# Patient Record
Sex: MALE | Employment: UNEMPLOYED | URBAN - METROPOLITAN AREA
[De-identification: names, ages, dates, MRNs, and addresses within clinical notes are randomized per-mention and may not be internally consistent; named-entity substitution may affect disease eponyms.]

---

## 2018-04-11 RX ORDER — METHOCARBAMOL 500 MG/1
TABLET, FILM COATED ORAL
COMMUNITY
Start: 2018-03-05

## 2018-04-11 RX ORDER — ASPIRIN 325 MG
325 TABLET ORAL
COMMUNITY

## 2018-04-11 RX ORDER — LISINOPRIL 10 MG/1
10 TABLET ORAL
COMMUNITY
Start: 2018-03-06 | End: 2020-06-23

## 2018-04-11 RX ORDER — MELOXICAM 15 MG/1
TABLET ORAL
COMMUNITY
Start: 2018-03-05

## 2018-04-26 RX ORDER — ATORVASTATIN CALCIUM 40 MG/1
40 TABLET, FILM COATED ORAL
COMMUNITY
Start: 2018-04-23

## 2018-05-01 ENCOUNTER — OFFICE VISIT (OUTPATIENT)
Dept: VASCULAR SURGERY | Facility: CLINIC | Age: 73
End: 2018-05-01
Payer: MEDICARE

## 2018-05-01 VITALS
HEART RATE: 64 BPM | WEIGHT: 141.31 LBS | DIASTOLIC BLOOD PRESSURE: 84 MMHG | BODY MASS INDEX: 19.14 KG/M2 | HEIGHT: 72 IN | SYSTOLIC BLOOD PRESSURE: 144 MMHG

## 2018-05-01 DIAGNOSIS — Z72.0 CONTINUOUS TOBACCO ABUSE: ICD-10-CM

## 2018-05-01 DIAGNOSIS — R91.1 LUNG NODULE, SOLITARY: ICD-10-CM

## 2018-05-01 DIAGNOSIS — F17.210 CIGARETTE NICOTINE DEPENDENCE WITHOUT COMPLICATION: ICD-10-CM

## 2018-05-01 DIAGNOSIS — I71.40 ABDOMINAL AORTIC ANEURYSM WITHOUT RUPTURE: Primary | ICD-10-CM

## 2018-05-01 DIAGNOSIS — J43.1 PANLOBULAR EMPHYSEMA: ICD-10-CM

## 2018-05-01 PROCEDURE — 99213 OFFICE O/P EST LOW 20 MIN: CPT | Mod: PBBFAC,PO | Performed by: THORACIC SURGERY (CARDIOTHORACIC VASCULAR SURGERY)

## 2018-05-01 PROCEDURE — 99406 BEHAV CHNG SMOKING 3-10 MIN: CPT | Mod: S$PBB,,, | Performed by: THORACIC SURGERY (CARDIOTHORACIC VASCULAR SURGERY)

## 2018-05-01 PROCEDURE — 99999 PR PBB SHADOW E&M-EST. PATIENT-LVL III: CPT | Mod: PBBFAC,,, | Performed by: THORACIC SURGERY (CARDIOTHORACIC VASCULAR SURGERY)

## 2018-05-01 PROCEDURE — 99205 OFFICE O/P NEW HI 60 MIN: CPT | Mod: 25,S$PBB,, | Performed by: THORACIC SURGERY (CARDIOTHORACIC VASCULAR SURGERY)

## 2018-05-01 RX ORDER — IBUPROFEN 200 MG
1 TABLET ORAL DAILY
Qty: 30 PATCH | Refills: 3 | COMMUNITY
Start: 2018-05-01

## 2018-05-01 NOTE — LETTER
May 1, 2018      [place]  [address] [address] [facility] [address]  [facility] [address] [place] [address]-Cardiovascular Surgery  [address] [facility] [address]  Phone: [phone]          Patient: Kevin Benjamin   MR Number: [id number]   YOB: 1945   Date of Visit: 5/1/2018       Dear Margarita Mathew:    Thank you for referring Kevin Benjamin to me for evaluation. Attached you will find relevant portions of my assessment and plan of care.    If you have questions, please do not hesitate to call me. I look forward to following Kevin Benjamin along with you.    Sincerely,    Serg Driscoll MD    Enclosure  CC:  No Recipients    If you would like to receive this communication electronically, please contact [email or web address] or [phone] to request more information on [employer] Link access.    For providers and/or their staff who would like to refer a patient to [facility], please contact us through our one-stop-shop provider referral line, [facility], at [phone].    If you feel you have received this communication in error or would no longer like to receive these types of communications, please e-mail [email or web address]

## 2018-05-01 NOTE — PROGRESS NOTES
CHIEF COMPLAINT:   Chief Complaint   Patient presents with    Abdominal Aortic Aneurysm     Priyanka NP/AAA       REFERRING PROVIDER: Margarita Mathew NP    Reason for consult: Evaluation of abdominal aortic aneurysm    Subjective:     Patient is a 72 y.o. male whose nurse practitioner felt a pulsatility in the abdomen.  He was referred for ultrasound revealing an abdominal aortic aneurysm.  He also was referred for CT scan of the chest which revealed a small lung nodule and emphysema.  He is a long-term smoker of greater than 57 pack years.  He smokes 1 pack per day.  He has hypertension.  He does not always take his antihypertensives since some of his associates told him that lisinopril did not agree with them.    Patient Active Problem List    Diagnosis Date Noted    Abdominal aortic aneurysm without rupture 05/01/2018    Lung nodule, solitary 05/01/2018    Panlobular emphysema 05/01/2018    Continuous tobacco abuse 05/01/2018    Cigarette nicotine dependence without complication 05/01/2018     Past Medical History:   Diagnosis Date    Abdominal aortic aneurysm without rupture 5/1/2018    Cigarette nicotine dependence without complication 5/1/2018    Continuous tobacco abuse 5/1/2018    Hypertension     Lung nodule, solitary 5/1/2018    Panlobular emphysema 5/1/2018      Past Surgical History:   Procedure Laterality Date    Essential hypertension        Medication List with Changes/Refills   Current Medications    ASPIRIN (ECOTRIN) 81 MG EC TABLET    Take 81 mg by mouth.    ATORVASTATIN (LIPITOR) 40 MG TABLET    Take 40 mg by mouth.    LISINOPRIL 10 MG TABLET    Take 10 mg by mouth.    MELOXICAM (MOBIC) 15 MG TABLET    TAKE 1 TABLET BY MOUTH EVERY DAY WITH BREAKFAST    METHOCARBAMOL (ROBAXIN) 500 MG TAB    Take 1 tablet by mouth at bedtime if needed.     Review of patient's allergies indicates:  No Known Allergies     Social History   Substance Use Topics    Smoking status: Current Every Day  Smoker     Packs/day: 1.00     Years: 57.00     Types: Cigarettes    Smokeless tobacco: Never Used    Alcohol use Yes      Comment: occasional/beer      Family History   Problem Relation Age of Onset    Cancer Mother     Heart attack Father     COPD Sister       Review of Systems  Gen.: No fevers, chills, night sweats, weight changes.  HEENT: No visual field changes or hoarseness.  Neck: No complaints.  Respiratory: Positive mild shortness of breath.  Positive cough especially if he uses his air-conditioning at night (he has not changed his air-conditioning a filter in quite a while).  No hemoptysis.  Cardiac: No angina, orthopnea, PND.  : No dysuria frequency.  GI: No melena or diarrhea.  Musculoskeletal: No significant arthralgias.  Neurologic: No lateralizing complaints.  Vascular: No claudication.    Objective: Physical exam      Vitals:    05/01/18 0957   BP: (!) 144/84   Pulse: 64   Weight: 64.1 kg (141 lb 5 oz)   Height: 6' (1.829 m)   PainSc: 0-No pain     Gen.: Well-nourished, well-developed, slender man in no obvious distress.  He has a pack of Pall Mal cigarettes in his left front shirt pocket.  HEENT: Normocephalic, atraumatic.  There is good facial symmetry.  Neck: Trachea is midline.  There are no palpable masses.  There are no carotid bruits.  Chest: No chest wall abnormalities.  Lungs: Clear bilaterally with good air movement throughout all lung fields.  Heart: Regular rate and rhythm.  No rub or murmur.  Abdomen: Aortic pulsatility is slightly increased.  There are no abdominal bruits.  Bowel sounds are normal.  There is no hepatomegaly.  Extremities: No cyanosis, clubbing, edema.  Vascular: 2+ popliteal and radial pulses bilaterally.  Pedal pulses are diminished but present.  Skin: No rash.  Neurologic: Alert and oriented ×4 with no focal deficits.    Data Review:   No results found for: WBC, HGB, HCT, MCV, PLT,   BMP No results found for: NA, K, CL, CO2, BUN, CREATININE, CALCIUM, ANIONGAP,  ESTGFRAFRICA, EGFRNONAA,   CMP  No results found for: NA, K, CL, CO2, GLU, BUN, CREATININE, CALCIUM, PROT, ALBUMIN, BILITOT, ALKPHOS, AST, ALT, ANIONGAP, ESTGFRAFRICA, EGFRNONAA,   No results found for: INR, PROTIME    ECG:No results found for this visit on 05/01/18.    Chest X-Ray: No results found for this visit on 05/01/18.No results found for this visit on 05/01/18.    CT Scan: CT scan lung cancer screening of chest from 4/12/2018 at [facility] is reviewed.  I reviewed the study personally.  There is diffuse panlobular emphysema.  It affects the upper and mid lung fields more so than the lower lung fields.  There is a 5 mm solid medial right upper lobe abnormality noted on image #64 with lung rads category 3.  A peripheral interstitial opacity lower in the right upper lobe and peripherally is present suggestive of pneumonitis or scarring.    Ultrasound abdominal aorta from [facility] on 3/28/2018.:  The abdominal aorta is aneurysmal in caliber measuring 2.7 cm proximally, 2.1 cm within its midportion, and 4.3 x 4.1 cm distally.  There is prominent mural thrombus within the aneurysm sac the aorta at the iliac bifurcation measures 1.9 cm in caliber.  The right common iliac artery is normal.  Left common iliac artery measures 1.3 cm in caliber.     Assessment:     Kevin was seen today for abdominal aortic aneurysm.    Diagnoses and all orders for this visit:    1.  Abdominal aortic aneurysm without rupture    2.  Lung nodule, solitary    3.  Panlobular emphysema    4.  Continuous tobacco abuse    5.  Cigarette nicotine dependence without complication        Plan:     1.  With regard to the abdominal aortic aneurysm:  Patient should follow in 1 year with ultrasound of the abdominal aorta.  A lengthy discussion regarding the necessity of smoking cessation since patients with aneurysms tend to have more rapid enlargement and high risk of rupture if patient is smoking tobacco.    2.  Nicotine patch will be prescribed to  assist with tobacco cessation.  3.  Patient is given information on tobacco/smoking cessation.  Patient was counseled for proximally 5 minutes regarding the necessity of smoking cessation as it relates to both his aneurysmal disease and his emphysema.    4.  Follow-up CT scan of the chest has been ordered by his primary treatment team.

## 2019-12-17 ENCOUNTER — TELEPHONE (OUTPATIENT)
Dept: VASCULAR SURGERY | Facility: CLINIC | Age: 74
End: 2019-12-17

## 2020-06-04 ENCOUNTER — TELEPHONE (OUTPATIENT)
Dept: VASCULAR SURGERY | Facility: CLINIC | Age: 75
End: 2020-06-04

## 2020-06-04 NOTE — TELEPHONE ENCOUNTER
----- Message from Sheila Ayoub sent at 6/4/2020 11:32 AM CDT -----  Pt wife is calling to schedule an appt like for tomorrow pt seen his pcp and the pt would like for the nurse to gim him a call back [phone] or [phone]

## 2020-06-23 ENCOUNTER — OFFICE VISIT (OUTPATIENT)
Dept: CARDIAC SURGERY | Facility: CLINIC | Age: 75
End: 2020-06-23
Payer: MEDICARE

## 2020-06-23 VITALS
WEIGHT: 123.88 LBS | DIASTOLIC BLOOD PRESSURE: 84 MMHG | SYSTOLIC BLOOD PRESSURE: 145 MMHG | HEIGHT: 72 IN | HEART RATE: 69 BPM | BODY MASS INDEX: 16.78 KG/M2

## 2020-06-23 DIAGNOSIS — I77.1 ILIAC ARTERY STENOSIS, LEFT: ICD-10-CM

## 2020-06-23 DIAGNOSIS — I73.9 BILATERAL CLAUDICATION OF LOWER LIMB: ICD-10-CM

## 2020-06-23 DIAGNOSIS — Z01.818 ENCOUNTER FOR OTHER PREPROCEDURAL EXAMINATION: ICD-10-CM

## 2020-06-23 DIAGNOSIS — I71.40 ABDOMINAL AORTIC ANEURYSM WITHOUT RUPTURE: Primary | ICD-10-CM

## 2020-06-23 PROCEDURE — 99999 PR PBB SHADOW E&M-EST. PATIENT-LVL III: ICD-10-PCS | Mod: PBBFAC,,, | Performed by: THORACIC SURGERY (CARDIOTHORACIC VASCULAR SURGERY)

## 2020-06-23 PROCEDURE — 99214 PR OFFICE/OUTPT VISIT, EST, LEVL IV, 30-39 MIN: ICD-10-PCS | Mod: S$GLB,,, | Performed by: THORACIC SURGERY (CARDIOTHORACIC VASCULAR SURGERY)

## 2020-06-23 PROCEDURE — 99214 OFFICE O/P EST MOD 30 MIN: CPT | Mod: S$GLB,,, | Performed by: THORACIC SURGERY (CARDIOTHORACIC VASCULAR SURGERY)

## 2020-06-23 PROCEDURE — 99999 PR PBB SHADOW E&M-EST. PATIENT-LVL III: CPT | Mod: PBBFAC,,, | Performed by: THORACIC SURGERY (CARDIOTHORACIC VASCULAR SURGERY)

## 2020-06-23 PROCEDURE — 99213 OFFICE O/P EST LOW 20 MIN: CPT | Mod: PBBFAC,PO | Performed by: THORACIC SURGERY (CARDIOTHORACIC VASCULAR SURGERY)

## 2020-06-23 RX ORDER — LISINOPRIL 40 MG/1
TABLET ORAL
COMMUNITY
Start: 2020-06-19

## 2020-06-23 RX ORDER — AMLODIPINE BESYLATE 2.5 MG/1
TABLET ORAL
COMMUNITY
Start: 2020-06-13

## 2020-06-23 NOTE — PROGRESS NOTES
CHIEF COMPLAINT:   Chief Complaint   Patient presents with    AAA     Sommers/AAA         History of Present Illness     Patient is a 74 y.o. male who I saw in 2018 with an infrarenal abdominal aortic aneurysm and an abnormal CT scan lung screening of the chest.  I recommended smoking cessation at that time, but he continues to smoke cigarettes.  He states that he did not receive nicotine patch his from smoking cessation although he and his wife both admit that he probably would not of stop smoking any way.  He is continue with some weight loss and has poor appetite.  He has no abdominal pain but he has intermittent low back pains.  He has numbness in his thighs and developed weakness in his legs especially with pains in the left thigh and lower leg.    Patient Active Problem List    Diagnosis Date Noted    Abdominal aortic aneurysm without rupture 05/01/2018    Lung nodule, solitary 05/01/2018    Panlobular emphysema 05/01/2018    Continuous tobacco abuse 05/01/2018    Cigarette nicotine dependence without complication 05/01/2018     Past Medical History:   Diagnosis Date    Abdominal aortic aneurysm without rupture 5/1/2018    Cigarette nicotine dependence without complication 5/1/2018    Continuous tobacco abuse 5/1/2018    Hypertension     Lung nodule, solitary 5/1/2018    Panlobular emphysema 5/1/2018      Past Surgical History:   Procedure Laterality Date    Essential hypertension        Medication List with Changes/Refills   Current Medications    AMLODIPINE (NORVASC) 2.5 MG TABLET    Take 1 tablet (2.5 mg total) by mouth daily    ASPIRIN (ECOTRIN) 81 MG EC TABLET    Take 81 mg by mouth.    ATORVASTATIN (LIPITOR) 40 MG TABLET    Take 40 mg by mouth.    LISINOPRIL (PRINIVIL,ZESTRIL) 40 MG TABLET    Take 1 tablet (40 mg total) by mouth daily    MELOXICAM (MOBIC) 15 MG TABLET    TAKE 1 TABLET BY MOUTH EVERY DAY WITH BREAKFAST    METHOCARBAMOL (ROBAXIN) 500 MG TAB    Take 1 tablet by mouth at  bedtime if needed.    NICOTINE (NICODERM CQ) 21 MG/24 HR    Place 1 patch onto the skin once daily.   Discontinued Medications    LISINOPRIL 10 MG TABLET    Take 10 mg by mouth.     Review of patient's allergies indicates:  No Known Allergies   Social History     Tobacco Use    Smoking status: Current Every Day Smoker     Packs/day: 1.00     Years: 57.00     Pack years: 57.00     Types: Cigarettes    Smokeless tobacco: Never Used   Substance Use Topics    Alcohol use: Yes     Comment: occasional/beer      Family History   Problem Relation Age of Onset    Cancer Mother     Heart attack Father     COPD Sister       Review of Systems  General:  He has continued with mild weight loss secondary to poor appetite.  HEENT:  No visual field changes.  Neck:  No complaints.  Respiratory:  Mild shortness of breath.  No cough or hemoptysis.  Cardiac:  No angina.  GI:  No constipation or melena.  No abdominal pain.  His appetite is poor.  :  No dysuria.  Skin:  No rashes.  Vascular:  Positive mild claudication in the thighs left greater than right.  His legs tire easily.    Objective: Physical Exam     Vitals:    06/23/20 1005   BP: (!) 145/84   Pulse: 69   Weight: 56.2 kg (123 lb 14.4 oz)   Height: 6' (1.829 m)   PainSc: 0-No pain     General:  Slender man in no obvious distress.  HEENT:  Normocephalic, atraumatic.  Neck:  No carotid bruits.  Chest:  No abnormalities.  Lungs:  Clear but with shallow breaths even when trying to take deep respirations.  Lungs are otherwise clear.  Heart:  Regular rate and rhythm.  No rubs or murmurs.  Abdomen:  Scaphoid.  The abdominal aortic pulsation is notable to the left of the midline.  There is a soft bruit over the palpable pulsatile aneurysm.  Extremities:  No cyanosis, clubbing, edema.  Vascular:  1+ right femoral 1-left femoral pulses.  Popliteal and dorsalis pedis pulses and posterior tibial pulses are not palpable.  Neurologic:  Alert oriented x4 no focal deficits.  Skin:  No  rash.      Data Review:   No results found for: WBC, HGB, HCT, MCV, PLT,   BMP No results found for: NA, K, CL, CO2, BUN, CREATININE, CALCIUM, ANIONGAP, ESTGFRAFRICA, EGFRNONAA,   CMP  No results found for: NA, K, CL, CO2, GLU, BUN, CREATININE, CALCIUM, PROT, ALBUMIN, BILITOT, ALKPHOS, AST, ALT, ANIONGAP, ESTGFRAFRICA, EGFRNONAA,   No results found for: INR, PROTIME    CT Scan: CT scan lung cancer screening of chest from 4/12/2018 at [place] is reviewed.  I reviewed the study personally.  There is diffuse panlobular emphysema.  It affects the upper and mid lung fields more so than the lower lung fields.  There is a 5 mm solid medial right upper lobe abnormality noted on image #64 with lung rads category 3.  A peripheral interstitial opacity lower in the right upper lobe and peripherally is present suggestive of pneumonitis or scarring.     CT Lung Cancer Screening WO Contrast6/3/2020  [facility]  Result Narrative   REASON FOR EXAM: [R91.1]-Solitary pulmonary nodule / Lung nodule, < 6mm, high cancer risk, initial follow up exam    TECHNICAL FACTORS: CT scan of the chest without intravenous contrast, using low dose lung cancer screening protocol.    COMPARISON: April 12, 2018. Year 2 followup.    FINDINGS:    LUNG NODULE(S):    Right:  Image #68. Location: Medial aspect right upper lobe. Size: 5 mm. Character: Solid. Change: Stable.  Image #84. Location: Right middle lobe. Size: 8 mm. Character: Solid. Change: Stable.  Image #110. Location: Right lower lobe. Size: 6 mm. Character: Solid. Change: Stable.    Left:  Image #39. Location: Left upper lobe. Size: 2 mm. Character: Solid. Change: Stable.    OTHER FINDINGS: Emphysema is present throughout the lungs. Calcified granulomas present in the left lower lobe. Coronary artery calcifications are present. Interstitial scarring is present in the lung bases. Borderline enlarged precarinal lymph node is   present measuring 10 mm short axis, stable.  Degenerative disc disease is present in the thoracic spine.    IMPRESSION:  Lung-RADS Category: 2 - Benign appearance or behavior.    OTHER FINDINGS:    1.  Emphysema.   2.  Coronary artery disease.  3. Stable borderline mediastinal lymphadenopathy.     RECOMMENDATIONS: Continue annual screening with low-dose Chest CT in 12 months for a total of three CT examinations.    Electronically signed by Chin Gamboa MD on 6/3/2020 11:05 AM         Ultrasound abdominal aorta from [place] on 3/28/2018.:  The abdominal aorta is aneurysmal in caliber measuring 2.7 cm proximally, 2.1 cm within its midportion, and 4.3 x 4.1 cm distally.  There is prominent mural thrombus within the aneurysm sac the aorta at the iliac bifurcation measures 1.9 cm in caliber.  The right common iliac artery is normal.  Left common iliac artery measures 1.3 cm in caliber.       US Duplex Aorta6/3/2020  [facility]  Result Narrative   REASON FOR EXAM: [I71.4]-Abdominal aortic aneurysm, without rupture     TECHNICAL FACTORS: B-mode and Doppler sonographic images were obtained of the abdominal aorta.    TECHNOLOGIST: Bekah Avery    COMPARISON: 03/28/2018     FINDINGS: The abdominal aorta is dilated  in caliber measuring 2.8 x 2.8  cm proximally, 2.1 x 2.1  cm within its midportion, and 4.9 x 4.3  cm distally. The aneurysm demonstrates prominent mural thrombus and spans a length of 7.7 cm. This is minimally   increased when compared to 03/28/2018 (4.3 cm in greatest axial dimension). Normal flow is visualized within the abdominal aorta. The right common iliac artery is of normal caliber. The left is ectatic measuring 1.2 cm. There is also elevated velocity in   the left iliac artery measuring 415 cm/s.     IMPRESSION:   1.  Distal abdominal aortic aneurysm with minimal increase in size when compared to 03/28/2020.   2.  Ectatic left common iliac artery with notable elevated peak systolic velocities concerning for flow-limiting  stenosis.         Electronically signed by Brian Ramírez MD on 6/3/2020 10:42 AM   Other Result Information   Interface, Rad Results In - 06/03/2020 10:45 AM CDT  REASON FOR EXAM: [I71.4]-Abdominal aortic aneurysm, without rupture     TECHNICAL FACTORS: B-mode and Doppler sonographic images were obtained of the abdominal aorta.    TECHNOLOGIST: Bekah Avery    COMPARISON: 03/28/2018     FINDINGS: The abdominal aorta is dilated  in caliber measuring 2.8 x 2.8  cm proximally, 2.1 x 2.1  cm within its midportion, and 4.9 x 4.3  cm distally. The aneurysm demonstrates prominent mural thrombus and spans a length of 7.7 cm. This is minimally increased when compared to 03/28/2018 (4.3 cm in greatest axial dimension). Normal flow is visualized within the abdominal aorta. The right common iliac artery is of normal caliber. The left is ectatic measuring 1.2 cm. There is also elevated velocity in the left iliac artery measuring 415 cm/s.     IMPRESSION:   1.  Distal abdominal aortic aneurysm with minimal increase in size when compared to 03/28/2020.   2.  Ectatic left common iliac artery with notable elevated peak systolic velocities concerning for flow-limiting stenosis.         Electronically signed by Brian Ramírez MD on 6/3/2020 10:42 AM           Assessment:     1.  Infrarenal abdominal aortic aneurysm without rupture but with increased in size over the last 2 years.  2.  Left greater than right leg claudication with signs of hemodynamic compromise of left iliac artery by ultrasound.  3.  Stable CT scan of chest with small nodules that have not changed despite ongoing chronic cigarette use.  4.  Chronic ongoing tobacco use.    Plan:     1.  CT angiogram of the abdomen and pelvis with runoff to the legs will be performed.  He will follow with me after this study.  2.  CT lung screening study will be performed in 1 year per Radiology recommendations.  3.  I again discussed the importance of smoking cessation with him.  We  will try to assist him obtaining nicotine patches which she states were not given to him by the smoking cessation personnel.

## 2020-07-27 ENCOUNTER — HOSPITAL ENCOUNTER (OUTPATIENT)
Dept: RADIOLOGY | Facility: HOSPITAL | Age: 75
Discharge: HOME | End: 2020-07-27
Attending: THORACIC SURGERY (CARDIOTHORACIC VASCULAR SURGERY)
Payer: MEDICARE

## 2020-07-27 DIAGNOSIS — I73.9 BILATERAL CLAUDICATION OF LOWER LIMB: ICD-10-CM

## 2020-07-27 DIAGNOSIS — I77.1 ILIAC ARTERY STENOSIS, LEFT: ICD-10-CM

## 2020-07-27 DIAGNOSIS — I71.40 ABDOMINAL AORTIC ANEURYSM WITHOUT RUPTURE: ICD-10-CM

## 2020-07-27 PROCEDURE — 75635 CTA RUNOFF ABD PEL BILAT LOWER EXT: ICD-10-PCS | Mod: 26,,, | Performed by: RADIOLOGY

## 2020-07-27 PROCEDURE — 75635 CT ANGIO ABDOMINAL ARTERIES: CPT | Mod: 26,,, | Performed by: RADIOLOGY

## 2020-07-27 PROCEDURE — 75635 CT ANGIO ABDOMINAL ARTERIES: CPT | Mod: TC,PO

## 2020-07-27 PROCEDURE — 25500020 PHARM REV CODE 255: Mod: PO | Performed by: THORACIC SURGERY (CARDIOTHORACIC VASCULAR SURGERY)

## 2020-07-27 RX ADMIN — IOHEXOL 100 ML: 350 INJECTION, SOLUTION INTRAVENOUS at 03:07

## 2020-07-30 ENCOUNTER — TELEPHONE (OUTPATIENT)
Dept: VASCULAR SURGERY | Facility: CLINIC | Age: 75
End: 2020-07-30

## 2020-07-30 NOTE — TELEPHONE ENCOUNTER
----- Message from Jeff Valencia sent at 7/30/2020  2:27 PM CDT -----  Regarding: Results  Contact: Spouse [phone]  Patient would like to get test results.  Name of test (lab, mammo, etc.):  CT/NON fasting   Date of test:  7/27/20  Ordering provider:   Where was the test performed:  [facility]  Comments:  Spouse [phone]    Please call an advise  Thank you

## 2020-08-10 NOTE — TELEPHONE ENCOUNTER
----- Message from Laura Mcclelland MA sent at 8/10/2020 11:27 AM CDT -----  Type: Needs Medical Advice  Who Called:  Varshadwayne Mackenzie Call Back Number: [phone]  Additional Information: patient's spouse would like to discuss patient's upcoming procedure.  She would like to know the status of the scheduling process

## 2020-08-11 ENCOUNTER — TELEPHONE (OUTPATIENT)
Dept: VASCULAR SURGERY | Facility: CLINIC | Age: 75
End: 2020-08-11

## 2020-08-11 NOTE — TELEPHONE ENCOUNTER
I discussed the findings of patient's most recent CT scan with his wife.  Findings include a 4.8 cm infrarenal abdominal aortic aneurysm that arises approximately 3 mm below the renal arteries.  There is also significant left common iliac artery stenosis and other moderate peripheral vascular disease distal to this.  The location of the aneurysm in proximity to the renal arteries prevents endovascular repair.  I will refer him to my colleagues for evaluation for potential open aneurysm repair including bypass of the stenotic iliac artery at their discretion.

## 2020-08-12 NOTE — TELEPHONE ENCOUNTER
See Dr Banuelos's note dated 8/11/20. Test results given to Dr Goff's nurse to schedule appointment.

## 2020-09-10 ENCOUNTER — OFFICE VISIT (OUTPATIENT)
Dept: CARDIAC SURGERY | Facility: CLINIC | Age: 75
End: 2020-09-10
Payer: MEDICARE

## 2020-09-10 VITALS
BODY MASS INDEX: 16.62 KG/M2 | WEIGHT: 122.69 LBS | HEART RATE: 67 BPM | DIASTOLIC BLOOD PRESSURE: 87 MMHG | HEIGHT: 72 IN | TEMPERATURE: 98 F | SYSTOLIC BLOOD PRESSURE: 151 MMHG

## 2020-09-10 DIAGNOSIS — I71.40 ABDOMINAL AORTIC ANEURYSM (AAA) WITHOUT RUPTURE: Primary | ICD-10-CM

## 2020-09-10 DIAGNOSIS — F17.228 NICOTINE DEPENDENCE, CHEWING TOBACCO, WITH OTHER NICOTINE-INDUCED DISORDERS: ICD-10-CM

## 2020-09-10 DIAGNOSIS — I73.9 PAD (PERIPHERAL ARTERY DISEASE): ICD-10-CM

## 2020-09-10 PROCEDURE — 99999 PR PBB SHADOW E&M-EST. PATIENT-LVL III: CPT | Mod: PBBFAC,,, | Performed by: THORACIC SURGERY (CARDIOTHORACIC VASCULAR SURGERY)

## 2020-09-10 PROCEDURE — 3008F BODY MASS INDEX DOCD: CPT | Mod: CPTII,S$GLB,, | Performed by: THORACIC SURGERY (CARDIOTHORACIC VASCULAR SURGERY)

## 2020-09-10 PROCEDURE — 99999 PR PBB SHADOW E&M-EST. PATIENT-LVL III: ICD-10-PCS | Mod: PBBFAC,,, | Performed by: THORACIC SURGERY (CARDIOTHORACIC VASCULAR SURGERY)

## 2020-09-10 PROCEDURE — 99406 BEHAV CHNG SMOKING 3-10 MIN: CPT | Mod: S$GLB,,, | Performed by: THORACIC SURGERY (CARDIOTHORACIC VASCULAR SURGERY)

## 2020-09-10 PROCEDURE — 99215 OFFICE O/P EST HI 40 MIN: CPT | Mod: 25,S$GLB,, | Performed by: THORACIC SURGERY (CARDIOTHORACIC VASCULAR SURGERY)

## 2020-09-10 PROCEDURE — 99215 PR OFFICE/OUTPT VISIT, EST, LEVL V, 40-54 MIN: ICD-10-PCS | Mod: 25,S$GLB,, | Performed by: THORACIC SURGERY (CARDIOTHORACIC VASCULAR SURGERY)

## 2020-09-10 PROCEDURE — 99406 PR TOBACCO USE CESSATION INTERMEDIATE 3-10 MINUTES: ICD-10-PCS | Mod: S$GLB,,, | Performed by: THORACIC SURGERY (CARDIOTHORACIC VASCULAR SURGERY)

## 2020-09-10 PROCEDURE — 1159F MED LIST DOCD IN RCRD: CPT | Mod: S$GLB,,, | Performed by: THORACIC SURGERY (CARDIOTHORACIC VASCULAR SURGERY)

## 2020-09-10 PROCEDURE — 1159F PR MEDICATION LIST DOCUMENTED IN MEDICAL RECORD: ICD-10-PCS | Mod: S$GLB,,, | Performed by: THORACIC SURGERY (CARDIOTHORACIC VASCULAR SURGERY)

## 2020-09-10 PROCEDURE — 1125F PR PAIN SEVERITY QUANTIFIED, PAIN PRESENT: ICD-10-PCS | Mod: S$GLB,,, | Performed by: THORACIC SURGERY (CARDIOTHORACIC VASCULAR SURGERY)

## 2020-09-10 PROCEDURE — 1101F PT FALLS ASSESS-DOCD LE1/YR: CPT | Mod: CPTII,S$GLB,, | Performed by: THORACIC SURGERY (CARDIOTHORACIC VASCULAR SURGERY)

## 2020-09-10 PROCEDURE — 3008F PR BODY MASS INDEX (BMI) DOCUMENTED: ICD-10-PCS | Mod: CPTII,S$GLB,, | Performed by: THORACIC SURGERY (CARDIOTHORACIC VASCULAR SURGERY)

## 2020-09-10 PROCEDURE — 1101F PR PT FALLS ASSESS DOC 0-1 FALLS W/OUT INJ PAST YR: ICD-10-PCS | Mod: CPTII,S$GLB,, | Performed by: THORACIC SURGERY (CARDIOTHORACIC VASCULAR SURGERY)

## 2020-09-10 PROCEDURE — 1125F AMNT PAIN NOTED PAIN PRSNT: CPT | Mod: S$GLB,,, | Performed by: THORACIC SURGERY (CARDIOTHORACIC VASCULAR SURGERY)

## 2020-09-10 RX ORDER — OXYCODONE AND ACETAMINOPHEN 5; 325 MG/1; MG/1
TABLET ORAL
COMMUNITY
Start: 2020-09-09

## 2020-09-10 NOTE — LETTER
September 10, 2020      Serg Driscoll MD  [address] [facility] [address] [place] [address]           [facility] Cardiovascular Surgery  [address] [facility] [address]  Phone: [phone]  Fax: [phone]          Patient: Kevin Benjamin   MR Number: [id number]   YOB: 1945   Date of Visit: 9/10/2020       Dear Dr. Serg Driscoll:    Thank you for referring Kevin Benjamin to me for evaluation. Attached you will find relevant portions of my assessment and plan of care.    If you have questions, please do not hesitate to call me. I look forward to following Kevin Benjamin along with you.    Sincerely,    Halley Jung MD    Enclosure  CC:  No Recipients    If you would like to receive this communication electronically, please contact [email or web address] or [phone] to request more information on [employer] Link access.    For providers and/or their staff who would like to refer a patient to [facility], please contact us through our one-stop-shop provider referral line, [facility] , at [phone].    If you feel you have received this communication in error or would no longer like to receive these types of communications, please e-mail [email or web address]

## 2020-09-10 NOTE — PROGRESS NOTES
OFFICE VISIT      This patient was referred to me by Dr. 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  He is not a candidate for endograft repair because of a very short neck..  He has been experiencing symptoms of intermittent claudication affecting activities of daily living.  Yesterday he developed right facial pain and went to the emergency room.  He has an appointment to see his primary care physician tomorrow.      Past Medical History:   Diagnosis Date    Abdominal aortic aneurysm without rupture 5/1/2018    Cigarette nicotine dependence without complication 5/1/2018    Continuous tobacco abuse 5/1/2018    Hypertension     Lung nodule, solitary 5/1/2018    Panlobular emphysema 5/1/2018       Past Surgical History:   Procedure Laterality Date    Essential hypertension         Review of patient's allergies indicates:  No Known Allergies    Current Outpatient Medications   Medication Sig Dispense Refill    amLODIPine (NORVASC) 2.5 MG tablet Take 1 tablet (2.5 mg total) by mouth daily      aspirin (ECOTRIN) 81 MG EC tablet Take 81 mg by mouth.      atorvastatin (LIPITOR) 40 MG tablet Take 40 mg by mouth.      lisinopriL (PRINIVIL,ZESTRIL) 40 MG tablet Take 1 tablet (40 mg total) by mouth daily      meloxicam (MOBIC) 15 MG tablet TAKE 1 TABLET BY MOUTH EVERY DAY WITH BREAKFAST      oxyCODONE-acetaminophen (PERCOCET) 5-325 mg per tablet       methocarbamol (ROBAXIN) 500 MG Tab Take 1 tablet by mouth at bedtime if needed.      nicotine (NICODERM CQ) 21 mg/24 hr Place 1 patch onto the skin once daily. 30 patch 3     No current facility-administered medications for this visit.        Family History   Problem Relation Age of Onset    Cancer Mother     Heart attack Father     COPD Sister        Social History     Socioeconomic  History    Marital status:      Spouse name: Not on file    Number of children: Not on file    Years of education: Not on file    Highest education level: Not on file   Occupational History    Not on file   Social Needs    Financial resource strain: Not on file    Food insecurity     Worry: Not on file     Inability: Not on file    Transportation needs     Medical: Not on file     Non-medical: Not on file   Tobacco Use    Smoking status: Current Every Day Smoker     Packs/day: 1.00     Years: 57.00     Pack years: 57.00     Types: Cigarettes    Smokeless tobacco: Never Used   Substance and Sexual Activity    Alcohol use: Yes     Comment: occasional/beer    Drug use: Not on file    Sexual activity: Not on file   Lifestyle    Physical activity     Days per week: Not on file     Minutes per session: Not on file    Stress: Not on file   Relationships    Social connections     Talks on phone: Not on file     Gets together: Not on file     Attends Yarsanism service: Not on file     Active member of club or organization: Not on file     Attends meetings of clubs or organizations: Not on file     Relationship status: Not on file   Other Topics Concern    Not on file   Social History Narrative    Not on file       REVIEW OF SYSTEMS:  Right facial pain.  Pain in bilateral calves after walking short distances.  The patient states that he has significant difficulty difficulty going from the stooping to the standing position and has to hold on something and pulls himself up.  All other systems are reviewed and are negative.        PHYSICAL EXAM:  Physical Exam  Vitals signs and nursing note reviewed.   Constitutional:       General: He is not in acute distress.     Appearance: He is not ill-appearing, toxic-appearing or diaphoretic.   HENT:      Head: Normocephalic and atraumatic.   Eyes:      General: No scleral icterus.     Extraocular Movements: Extraocular movements intact.      Conjunctiva/sclera:  Conjunctivae normal.      Pupils: Pupils are equal, round, and reactive to light.   Neck:      Musculoskeletal: Normal range of motion. No neck rigidity or muscular tenderness.   Cardiovascular:      Rate and Rhythm: Normal rate and regular rhythm.      Comments: Decreased femoral pulses especially on the left.  I cannot palpate popliteal dorsalis pedis or posterior tibial pulses in either lower extremity.  Pulmonary:      Effort: Pulmonary effort is normal. No respiratory distress.      Breath sounds: No stridor. No wheezing.   Abdominal:      General: Abdomen is flat. There is no distension.      Palpations: Abdomen is soft. There is mass.      Tenderness: There is no abdominal tenderness. There is no guarding or rebound.      Comments: Pulsatile mass consistent with abdominal aortic aneurysm measuring around 5 cm on palpation   Musculoskeletal: Normal range of motion.   Lymphadenopathy:      Cervical: No cervical adenopathy.   Skin:     General: Skin is warm.   Neurological:      General: No focal deficit present.      Mental Status: He is alert and oriented to person, place, and time.      Cranial Nerves: No cranial nerve deficit.      Sensory: No sensory deficit.      Coordination: Coordination normal.      Gait: Gait normal.         Vitals:    09/10/20 1021   BP: (!) 151/87   Pulse: 67   Temp: 98.2 °F (36.8 °C)     CTA Runoff ABD PEL Bilat Lower Ext  Order: [id number]  Status:  Final result   Visible to patient:  No (inaccessible in Patient Portal) Next appt:  None Dx:  Abdominal aortic aneurysm without rup...  Details    Reading Physician Reading Date Result Priority   Darrel Bertrand MD  [phone] 7/27/2020 Routine      Narrative & Impression     EXAMINATION:  CTA RUNOFF ABD PEL BILAT LOWER EXT     CLINICAL HISTORY:  AAA, pre-op planning;Also need runoff secondary to peripheral vascular disease;  Abdominal aortic aneurysm, without rupture     TECHNIQUE:  Thin-section axial images are obtained from the  lung bases to the ankles following the IV administration of 100 cc of Omnipaque 350.     COMPARISON:  None     FINDINGS:  Lung bases demonstrate moderate is moderate bullous emphysema descending colonic and sigmoid diverticulosis is evident.  Prostate gland is diffusely prominent.  Seminal vesicles are prominent.     At the level of the aortic hiatus the maximal external diameter of the aorta is approximately 3 cm.  Mild plaque formation is seen at the origin of the celiac artery and there is a 30-40% luminal diameter narrowing of the proximal celiac artery.  There is an infundibular origin to the superior mesenteric artery and there is moderate plaque formation with a proximal 45-50% luminal diameter narrowing.  Single right renal artery is evident and there is no evidence of stenosis.  On the left 2 main renal arteries are evident within more distal being more inferior.  Beginning almost immediately inferior to the inferior left renal artery there is a fusiform infrarenal abdominal aortic aneurysm with a length of 8 cm and a maximal cross-sectional diameter of 4.8 cm.  This tapers to 1.9 cm above the level of the aortic bifurcation and at the origin of the left common iliac artery there is an approximately 80% luminal diameter narrowing.  There is diffuse disease in the left common iliac and there is apparent short segment occlusion of the left internal iliac which demonstrates diffuse calcification.  There is some recannulization of the branches more distally.  Left external iliac diffusely diseased and is sin in its distal to poor portion there is a 30-40% luminal diameter narrowing.  There is approximately 40% luminal diameter narrowing in the left common femoral artery and there is moderate stenosis in the proximal left superficial femoral artery.  There is extensive plaque formation and some ectasia and atherosclerosis in the right common iliac artery with areas of narrowing measuring as much as 40-50%.  A  high-grade stenosis is seen at the origin of the right internal iliac artery which demonstrates diffuse disease.  There is moderate diffuse disease within the right external iliac artery with areas of 30-40% luminal diameter narrowing distally.  Just above the common femoral artery there is a 40-50% luminal diameter narrowing.     Within the aneurysm sac there is calcification and the inferior mesenteric artery is not confidently seen.  On axial image 319 series 2 there is an ectatic lumbar artery exiting posteriorly from the patent portion of the aneurysm.     Right leg moderate disease is seen in the right common femoral artery and there is a 60% luminal diameter narrowing proximally and in the midportion.  The origin of the superficial femoral artery demonstrates a 50% stenosis and there is moderate diffuse disease within the proximal and mid superficial femoral artery in the proximal profundus femoris artery.  In the region of the adductor hiatus there is a 70 80% luminal diameter narrowing and there is moderate diffuse disease within the right popliteal artery as well as the tibioperoneal trunk with small but patent right anterior tibial, posterior tibial and peroneal arteries being demonstrated to the level of the calf.     There is moderate 40-50% luminal diameter narrowing of the origin of the superficial femoral artery and profundus femoris artery with diffuse disease seen in the left superficial femoral artery with multiple areas of moderate narrowing.  There is a high-grade perhaps 60-70% narrowing in the adductor hiatus region with moderate diffuse disease seen in the left popliteal artery.  There is plaque formation and a 50-60% narrowing of the proximal anterior tibial artery with diffuse disease but patent left anterior tibial, peroneal and posterior tibial arteries.  The dorsalis pedis on the left is small but patent.     Impression:     Extensive atherosclerosis with a fusiform infrarenal abdominal  aortic aneurysm with a maximal cross-sectional diameter 4.8 cm.  There is a lumbar artery emanating from the posterior aneurysm sac distally.  The this tapers above the aortic arch and there is a high-grade stenosis at the left common iliac artery origin with short-segment occlusion of the left internal iliac.  There is diffuse right common iliac and bilateral external iliac artery disease.     Bilateral common femoral and superficial femoral artery disease is evident with stenosis in the adductor hiatus regions and diffusely diseased 3 vessel runoff to the level of the calf bilaterally.     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        Electronically signed by: Darrel Bertrand MD  Date:                                            07/27/2020  Time:                                           16:15                     IMPRESSION:  1.  Abdominal aortic aneurysm  2.  Peripheral arterial occlusive disease  3.  Intermittent claudication of bilateral lower extremities affecting activities of daily living.  4.  Chronic obstructive pulmonary disease  5.  Hypertension  6.  Nicotine dependence, cigarette smoking  7.  Generalized weakness  8.  Facial pain      RECOMMENDATIONS:  The patient has a 4.8 cm infrarenal abdominal aortic aneurysm.  He has a very short neck and he is not a candidate for endograft repair.  I discussed the results of the [place] small aneurysm study and the EVELIN studies which stated that it is safe to wait until the aneurysm gets to around 5-1/2 cm.  However the patient also has significant intermittent claudication and aortoiliac occlusive disease.  Risks and benefits were discussed with the patient and his wife.  Risks include but are not limited to death, bleeding, infection, limb loss, mesenteric ischemia with bowel gangrene, renal failure requiring dialysis, respiratory failure requiring prolonged intubation and tracheostomy,  pancreatitis, cholecystitis, etc.  Patient voiced understanding and wishes to proceed.  The patient was counseled regarding smoking cessation for about 5 min.  The effects of cigarette smoking on the cardiovascular and respiratory systems were discussed.  We will proceed with endoaneurysmorrhaphy and aortobifemoral graft and this will take care of the aneurysm and the occlusive disease in the iliac arteries.  We will proceed with this as soon as the patient is ready to undergo the operation.        Neeraj Jung MD

## 2021-01-04 ENCOUNTER — TELEPHONE (OUTPATIENT)
Dept: VASCULAR SURGERY | Facility: CLINIC | Age: 76
End: 2021-01-04

## 2021-01-08 ENCOUNTER — TELEPHONE (OUTPATIENT)
Dept: VASCULAR SURGERY | Facility: CLINIC | Age: 76
End: 2021-01-08

## 2021-01-28 ENCOUNTER — TELEPHONE (OUTPATIENT)
Dept: VASCULAR SURGERY | Facility: CLINIC | Age: 76
End: 2021-01-28

## 2021-02-08 ENCOUNTER — OUTSIDE PLACE OF SERVICE (OUTPATIENT)
Dept: ADMINISTRATIVE | Facility: OTHER | Age: 76
End: 2021-02-08
Payer: MEDICARE

## 2021-02-08 PROCEDURE — 35081 PR REANEURYSM/GRFT INS,ABDOMINAL AORTA: ICD-10-PCS | Mod: ,,, | Performed by: THORACIC SURGERY (CARDIOTHORACIC VASCULAR SURGERY)

## 2021-02-08 PROCEDURE — 35646 BPG AORTOBIFEMORAL: CPT | Mod: 51,,, | Performed by: THORACIC SURGERY (CARDIOTHORACIC VASCULAR SURGERY)

## 2021-02-08 PROCEDURE — 35646 PR BYPASS GRAFT OTHR,AORTOBIFEMORAL: ICD-10-PCS | Mod: 51,,, | Performed by: THORACIC SURGERY (CARDIOTHORACIC VASCULAR SURGERY)

## 2021-02-08 PROCEDURE — 35081 REPAIR DEFECT OF ARTERY: CPT | Mod: ,,, | Performed by: THORACIC SURGERY (CARDIOTHORACIC VASCULAR SURGERY)

## 2021-02-09 ENCOUNTER — OUTSIDE PLACE OF SERVICE (OUTPATIENT)
Dept: ADMINISTRATIVE | Facility: OTHER | Age: 76
End: 2021-02-09
Payer: MEDICARE

## 2021-02-09 PROCEDURE — 99024 PR POST-OP FOLLOW-UP VISIT: ICD-10-PCS | Mod: ,,, | Performed by: PHYSICIAN ASSISTANT

## 2021-02-09 PROCEDURE — 99024 POSTOP FOLLOW-UP VISIT: CPT | Mod: ,,, | Performed by: PHYSICIAN ASSISTANT

## 2021-02-10 ENCOUNTER — OUTSIDE PLACE OF SERVICE (OUTPATIENT)
Dept: ADMINISTRATIVE | Facility: OTHER | Age: 76
End: 2021-02-10
Payer: MEDICARE

## 2021-02-10 PROCEDURE — 99024 POSTOP FOLLOW-UP VISIT: CPT | Mod: ,,, | Performed by: PHYSICIAN ASSISTANT

## 2021-02-10 PROCEDURE — 99024 PR POST-OP FOLLOW-UP VISIT: ICD-10-PCS | Mod: ,,, | Performed by: PHYSICIAN ASSISTANT

## 2021-02-11 ENCOUNTER — OUTSIDE PLACE OF SERVICE (OUTPATIENT)
Dept: ADMINISTRATIVE | Facility: OTHER | Age: 76
End: 2021-02-11
Payer: MEDICARE

## 2021-02-11 PROCEDURE — 99024 POSTOP FOLLOW-UP VISIT: CPT | Mod: ,,, | Performed by: PHYSICIAN ASSISTANT

## 2021-02-11 PROCEDURE — 99024 PR POST-OP FOLLOW-UP VISIT: ICD-10-PCS | Mod: ,,, | Performed by: PHYSICIAN ASSISTANT

## 2021-02-14 PROCEDURE — G0180 MD CERTIFICATION HHA PATIENT: HCPCS | Mod: ,,, | Performed by: THORACIC SURGERY (CARDIOTHORACIC VASCULAR SURGERY)

## 2021-02-14 PROCEDURE — G0180 PR HOME HEALTH MD CERTIFICATION: ICD-10-PCS | Mod: ,,, | Performed by: THORACIC SURGERY (CARDIOTHORACIC VASCULAR SURGERY)

## 2021-02-15 ENCOUNTER — TELEPHONE (OUTPATIENT)
Dept: VASCULAR SURGERY | Facility: CLINIC | Age: 76
End: 2021-02-15

## 2021-02-17 ENCOUNTER — TELEPHONE (OUTPATIENT)
Dept: VASCULAR SURGERY | Facility: CLINIC | Age: 76
End: 2021-02-17

## 2021-02-26 ENCOUNTER — EXTERNAL HOME HEALTH (OUTPATIENT)
Dept: HOME HEALTH SERVICES | Facility: HOSPITAL | Age: 76
End: 2021-02-26
Payer: MEDICARE

## 2021-03-04 ENCOUNTER — OFFICE VISIT (OUTPATIENT)
Dept: VASCULAR SURGERY | Facility: CLINIC | Age: 76
End: 2021-03-04
Payer: MEDICARE

## 2021-03-04 VITALS
HEIGHT: 72 IN | SYSTOLIC BLOOD PRESSURE: 129 MMHG | HEART RATE: 72 BPM | DIASTOLIC BLOOD PRESSURE: 80 MMHG | WEIGHT: 105.81 LBS | BODY MASS INDEX: 14.33 KG/M2

## 2021-03-04 DIAGNOSIS — I71.40 ABDOMINAL AORTIC ANEURYSM (AAA) WITHOUT RUPTURE: Primary | ICD-10-CM

## 2021-03-04 PROCEDURE — 1100F PTFALLS ASSESS-DOCD GE2>/YR: CPT | Mod: CPTII,S$GLB,, | Performed by: THORACIC SURGERY (CARDIOTHORACIC VASCULAR SURGERY)

## 2021-03-04 PROCEDURE — 1100F PR PT FALLS ASSESS DOC 2+ FALLS/FALL W/INJURY/YR: ICD-10-PCS | Mod: CPTII,S$GLB,, | Performed by: THORACIC SURGERY (CARDIOTHORACIC VASCULAR SURGERY)

## 2021-03-04 PROCEDURE — 99999 PR PBB SHADOW E&M-EST. PATIENT-LVL III: CPT | Mod: PBBFAC,,, | Performed by: THORACIC SURGERY (CARDIOTHORACIC VASCULAR SURGERY)

## 2021-03-04 PROCEDURE — 99024 PR POST-OP FOLLOW-UP VISIT: ICD-10-PCS | Mod: S$GLB,,, | Performed by: THORACIC SURGERY (CARDIOTHORACIC VASCULAR SURGERY)

## 2021-03-04 PROCEDURE — 3288F FALL RISK ASSESSMENT DOCD: CPT | Mod: CPTII,S$GLB,, | Performed by: THORACIC SURGERY (CARDIOTHORACIC VASCULAR SURGERY)

## 2021-03-04 PROCEDURE — 1125F AMNT PAIN NOTED PAIN PRSNT: CPT | Mod: S$GLB,,, | Performed by: THORACIC SURGERY (CARDIOTHORACIC VASCULAR SURGERY)

## 2021-03-04 PROCEDURE — 3008F PR BODY MASS INDEX (BMI) DOCUMENTED: ICD-10-PCS | Mod: CPTII,S$GLB,, | Performed by: THORACIC SURGERY (CARDIOTHORACIC VASCULAR SURGERY)

## 2021-03-04 PROCEDURE — 3008F BODY MASS INDEX DOCD: CPT | Mod: CPTII,S$GLB,, | Performed by: THORACIC SURGERY (CARDIOTHORACIC VASCULAR SURGERY)

## 2021-03-04 PROCEDURE — 99999 PR PBB SHADOW E&M-EST. PATIENT-LVL III: ICD-10-PCS | Mod: PBBFAC,,, | Performed by: THORACIC SURGERY (CARDIOTHORACIC VASCULAR SURGERY)

## 2021-03-04 PROCEDURE — 3288F PR FALLS RISK ASSESSMENT DOCUMENTED: ICD-10-PCS | Mod: CPTII,S$GLB,, | Performed by: THORACIC SURGERY (CARDIOTHORACIC VASCULAR SURGERY)

## 2021-03-04 PROCEDURE — 1125F PR PAIN SEVERITY QUANTIFIED, PAIN PRESENT: ICD-10-PCS | Mod: S$GLB,,, | Performed by: THORACIC SURGERY (CARDIOTHORACIC VASCULAR SURGERY)

## 2021-03-04 PROCEDURE — 99024 POSTOP FOLLOW-UP VISIT: CPT | Mod: S$GLB,,, | Performed by: THORACIC SURGERY (CARDIOTHORACIC VASCULAR SURGERY)

## 2021-03-15 DIAGNOSIS — I73.9 PERIPHERAL VASCULAR DISEASE, UNSPECIFIED: ICD-10-CM

## 2021-03-15 DIAGNOSIS — I73.9 PAD (PERIPHERAL ARTERY DISEASE): Primary | ICD-10-CM

## 2021-03-23 ENCOUNTER — HOSPITAL ENCOUNTER (OUTPATIENT)
Dept: RADIOLOGY | Facility: HOSPITAL | Age: 76
Discharge: HOME | End: 2021-03-23
Attending: THORACIC SURGERY (CARDIOTHORACIC VASCULAR SURGERY)
Payer: MEDICARE

## 2021-03-23 DIAGNOSIS — I73.9 PAD (PERIPHERAL ARTERY DISEASE): ICD-10-CM

## 2021-03-23 PROCEDURE — 93925 US ARTERIAL LOWER EXTREMITY BILAT WITH ABI (XPD): ICD-10-PCS | Mod: 26,,, | Performed by: RADIOLOGY

## 2021-03-23 PROCEDURE — 93925 LOWER EXTREMITY STUDY: CPT | Mod: 26,,, | Performed by: RADIOLOGY

## 2021-03-23 PROCEDURE — 93922 UPR/L XTREMITY ART 2 LEVELS: CPT | Mod: TC,PO

## 2021-03-23 PROCEDURE — 93922 UPR/L XTREMITY ART 2 LEVELS: CPT | Mod: 26,,, | Performed by: RADIOLOGY

## 2021-03-23 PROCEDURE — 93922 US ARTERIAL LOWER EXTREMITY BILAT WITH ABI (XPD): ICD-10-PCS | Mod: 26,,, | Performed by: RADIOLOGY

## 2021-03-30 ENCOUNTER — TELEPHONE (OUTPATIENT)
Dept: VASCULAR SURGERY | Facility: CLINIC | Age: 76
End: 2021-03-30

## 2021-10-06 ENCOUNTER — TELEPHONE (OUTPATIENT)
Dept: VASCULAR SURGERY | Facility: CLINIC | Age: 76
End: 2021-10-06

## 2021-10-06 DIAGNOSIS — I73.9 PERIPHERAL VASCULAR DISEASE, UNSPECIFIED: Primary | ICD-10-CM

## 2021-10-06 DIAGNOSIS — I73.9 PAD (PERIPHERAL ARTERY DISEASE): ICD-10-CM

## 2021-10-18 ENCOUNTER — HOSPITAL ENCOUNTER (OUTPATIENT)
Dept: RADIOLOGY | Facility: HOSPITAL | Age: 76
Discharge: HOME | End: 2021-10-18
Attending: THORACIC SURGERY (CARDIOTHORACIC VASCULAR SURGERY)
Payer: MEDICARE

## 2021-10-18 DIAGNOSIS — I73.9 PERIPHERAL VASCULAR DISEASE, UNSPECIFIED: ICD-10-CM

## 2021-10-18 DIAGNOSIS — I73.9 PAD (PERIPHERAL ARTERY DISEASE): ICD-10-CM

## 2021-10-18 PROCEDURE — 93925 US ARTERIAL LOWER EXTREMITY BILAT WITH ABI (XPD): ICD-10-PCS | Mod: 26,,, | Performed by: RADIOLOGY

## 2021-10-18 PROCEDURE — 93922 US ARTERIAL LOWER EXTREMITY BILAT WITH ABI (XPD): ICD-10-PCS | Mod: 26,,, | Performed by: RADIOLOGY

## 2021-10-18 PROCEDURE — 93922 UPR/L XTREMITY ART 2 LEVELS: CPT | Mod: TC,PO

## 2021-10-18 PROCEDURE — 93925 LOWER EXTREMITY STUDY: CPT | Mod: 26,,, | Performed by: RADIOLOGY

## 2021-10-18 PROCEDURE — 93922 UPR/L XTREMITY ART 2 LEVELS: CPT | Mod: 26,,, | Performed by: RADIOLOGY
